# Patient Record
Sex: MALE | Race: BLACK OR AFRICAN AMERICAN | NOT HISPANIC OR LATINO | Employment: OTHER | URBAN - METROPOLITAN AREA
[De-identification: names, ages, dates, MRNs, and addresses within clinical notes are randomized per-mention and may not be internally consistent; named-entity substitution may affect disease eponyms.]

---

## 2020-06-20 ENCOUNTER — HOSPITAL ENCOUNTER (EMERGENCY)
Facility: HOSPITAL | Age: 35
Discharge: HOME | End: 2020-06-20
Attending: EMERGENCY MEDICINE
Payer: MEDICAID

## 2020-06-20 VITALS
WEIGHT: 150 LBS | SYSTOLIC BLOOD PRESSURE: 106 MMHG | TEMPERATURE: 98 F | DIASTOLIC BLOOD PRESSURE: 67 MMHG | OXYGEN SATURATION: 99 % | HEART RATE: 66 BPM | RESPIRATION RATE: 16 BRPM

## 2020-06-20 DIAGNOSIS — N43.2 OTHER HYDROCELE: Primary | ICD-10-CM

## 2020-06-20 DIAGNOSIS — N43.3 HYDROCELE IN ADULT: ICD-10-CM

## 2020-06-20 PROCEDURE — 99282 EMERGENCY DEPT VISIT SF MDM: CPT

## 2020-06-20 PROCEDURE — 99283 EMERGENCY DEPT VISIT LOW MDM: CPT | Mod: ,,, | Performed by: EMERGENCY MEDICINE

## 2020-06-20 PROCEDURE — 99283 PR EMERGENCY DEPT VISIT,LEVEL III: ICD-10-PCS | Mod: ,,, | Performed by: EMERGENCY MEDICINE

## 2020-06-20 RX ORDER — LEVOFLOXACIN 500 MG/1
500 TABLET, FILM COATED ORAL
COMMUNITY

## 2020-06-20 NOTE — ED PROVIDER NOTES
Encounter Date: 6/20/2020       History     Chief Complaint   Patient presents with    Testicle Pain     states he went to [facility] and was told it is an infection but wants a second opinion     35-year-old male presents with persistent swelling to his right testicle.  He has had this for several months.  He was seen at [facility] 8 days ago and was diagnosed with hydrocele and epididymitis.  He was taking antibiotics and he states that the pain is gone away but the swelling persist.  He denies any fever or dysuria.        Review of patient's allergies indicates:  No Known Allergies  History reviewed. No pertinent past medical history.  History reviewed. No pertinent surgical history.  No family history on file.  Social History     Tobacco Use    Smoking status: Light Tobacco Smoker    Smokeless tobacco: Never Used   Substance Use Topics    Alcohol use: Never     Frequency: Never    Drug use: Never     Review of Systems   Constitutional: Negative for diaphoresis and fever.   HENT: Negative for congestion.        Physical Exam     Initial Vitals [06/20/20 1404]   BP Pulse Resp Temp SpO2   106/67 66 16 97.5 °F (36.4 °C) 99 %      MAP       --         Physical Exam    Constitutional: He appears well-developed and well-nourished. He is not diaphoretic.   HENT:   Head: Normocephalic.   Eyes: EOM are normal. Pupils are equal, round, and reactive to light.   Neck: Normal range of motion. Neck supple. No JVD present.   Cardiovascular: Normal rate, regular rhythm and normal heart sounds.   Pulmonary/Chest: Breath sounds normal.   Genitourinary:    Genitourinary Comments: Patient swelling to his right testicle.  There is no erythema.  It is nontender.           ED Course   Procedures  Labs Reviewed - No data to display       Imaging Results    None          Medical Decision Making:   History:   Old Medical Records: I decided to obtain old medical records.  Old Records Summarized: records from previous  admission(s).       <> Summary of Records: I reviewed the ultrasound results from [facility] on June 12, 2020.  He had epididymitis and a right hydrocele.  Initial Assessment:   I suspect he has a right hydrocele.  I doubt this is malignant but did recommend follow-up with urologist.  No sign of infection on physical exam or by history.  Do not believe testing is indicated.  I placed a referral to Urology as well as recommended follow-up with primary care                                 Clinical Impression:       ICD-10-CM ICD-9-CM   1. Other hydrocele  N43.2 603.8   2. Hydrocele in adult  N43.3 603.9         Disposition:   Disposition: Discharged  Condition: Stable     ED Disposition Condition    Discharge Stable        ED Prescriptions     None        Follow-up Information     Follow up With Specialties Details Why Contact Info Additional Information    [facility] - Internal Medicine Internal Medicine Schedule an appointment as soon as possible for a visit in 2 days For Primary Care and Emergency Department Follow-up [address] [id number] [phone] [facility].    [facility] - Urology 4th Floor Urology Schedule an appointment as soon as possible for a visit   [address] [id number] [phone] [facility] - 4th Floor                                     Matt Reed MD  06/20/20 4700

## 2020-06-20 NOTE — ED NOTES
LOC: The patient is awake, alert, and oriented to place, time, situation. Affect is appropriate.  Speech is appropriate and clear.     APPEARANCE: Patient resting uncomfortably, concerned with right testicle infection not improving,  in no acute distress.  Patient is clean and well groomed.    SKIN: The skin is warm and dry; color consistent with ethnicity.  Patient has normal skin turgor and moist mucus membranes.  Skin intact; no breakdown or bruising noted.     MUSCULOSKELETAL: Patient moving upper and lower extremities without difficulty.  Denies weakness.     RESPIRATORY: Airway is open and patent. Respirations spontaneous, even, easy, and non-labored.  Patient has a normal effort and rate.  No accessory muscle use noted. Denies cough.     CARDIAC:   No peripheral edema noted. No complaints of chest pain.      ABDOMEN: Soft and non tender to palpation.  No distention noted.     NEUROLOGIC: Eyes open spontaneously.  Behavior appropriate to situation.  Follows commands; facial expression symmetrical.  Purposeful motor response noted; normal sensation in all extremities.

## 2020-06-20 NOTE — ED NOTES
To the ED for a second opinion. Seen at [facility] a week ago for infection near his testicle.  Has been taking ABX with no improvement.

## 2020-06-28 NOTE — PROGRESS NOTES
"Subjective:      Julián Corrigan is a 35 y.o. male who was self-referred for evaluation of scrotal swelling.      35-year-old male presents with persistent swelling to his right testicle.  He has had this for several months.  He first presented to [facility] ED on 6/12. US was performed. See below. Notes from this visit were reviewed under "care everywhere." GC/CHL was negative at the time. He was given rx for Levaquin and doxycycline. He completed antibiotics. Pain resolved but swelling did not subside. He then presented [place] [facility] [place] opinion. He was told that he has a hydrocele and was referred to Urology. He presents today to discuss swelling. He denies pain. Denies fever, chills, N/V. Denies STD exposure since last antibiotic. He denies dysuria, hematuria, urgency and frequency. Denies personal or family history of testicular cancer. Denies hernia. Denies previous  surgeries. No blood thinners.     The following portions of the patient's history were reviewed and updated as appropriate: allergies, current medications, past family history, past medical history, past social history, past surgical history and problem list.    Review of Systems  Constitutional: no fever or chills  ENT: no nasal congestion or sore throat  Respiratory: no cough or shortness of breath  Cardiovascular: no chest pain or palpitations  Gastrointestinal: no nausea or vomiting, tolerating diet  Genitourinary: as per HPI  Hematologic/Lymphatic: no easy bruising or lymphadenopathy  Musculoskeletal: no arthralgias or myalgias  Neurological: no seizures or tremors  Behavioral/Psych: no auditory or visual hallucinations     Objective:   Vitals: Wt 61.3 kg (135 lb 2.3 oz)     Physical Exam   General: alert and oriented, no acute distress  Head: normocephalic, atraumatic  Neck: supple, no lymphadenopathy, normal ROM, no masses  Respiratory: Symmetric expansion, non-labored breathing  Cardiovascular: regular rate and rhythm, nomal pulses, no " peripheral edema  Abdomen: soft, non tender, non distended, no palpable masses, no hernias, no hepatomegaly or splenomegaly  Genitourinary:   Penis: normal, no lesions, patent orthotopic meatus, no plaques  Scrotum: no rashes or skin changes;   Testes:  normal epididymides bilaterally, Right small to medium sized hydrocele noted  Lymphatic: no inguinal nodes  Skin: normal coloration and turgor, no rashes, no suspicious skin lesions noted  Neuro: alert and oriented x3, no gross deficits  Psych: normal judgment and insight, normal mood/affect and non-anxious    Physical Exam    Lab Review   Urinalysis demonstrates positive for protein (trace)    No results found for: WBC, HGB, HCT, MCV, PLT  No results found for: CREATININE, BUN  No results found for: PSA  Imaging    EXAM END TIME:6/12/2020 07:43 AM    REASON FOR STUDY:scrotal swelling    COMPARISON:None.      FINDINGS:  Right testicle is 3.0 x 2.4 x 2.0 cm and demonstrates normal flow.  Left testicle measures 3.7 x 1.6 x 2.7 cm and demonstrates normal flow. There is a loculated right hydrocele that is mildly complex. Right epididymal thickening compatible with epididymitis. Left epididymis is minimally thickened.    IMPRESSION:   1. Right epididymitis.  2. Right mildly complex hydrocele.        Electronically Signed By: Travis Rose MD 6/12/2020 8:28 AM CDT      Assessment:     1. Hydrocele, right        Plan:     Orders Placed This Encounter    Urine culture    C. trachomatis/N. gonorrhoeae by [facility]; Urine     Discussed conservative measures for relieving testicular pain - scrotal support, ibuprofen, scrotal elevation, ice packs.  --Will send urine for culture/ GC/CHL to r/o persistent infection. Low suspicion for infectious process.   --Pt wishes to proceed with surgical intervention of hydrocele. Will discuss surgery with collaboration MDs--Pt is aware that priority is given to urgent or emergent cases due to COVID 19. He expresses frustration,  but understands. Will notify him of results and if additional treatment is needed.     7/1 update: Pt information sent to Dr. Matson's staff for appt to assess hydrocele and discuss moving forward with surgical intervention. Dr. Matson is aware.

## 2020-06-29 ENCOUNTER — OFFICE VISIT (OUTPATIENT)
Dept: UROLOGY | Facility: CLINIC | Age: 35
End: 2020-06-29
Payer: MEDICAID

## 2020-06-29 VITALS — WEIGHT: 135.13 LBS

## 2020-06-29 DIAGNOSIS — N43.3 HYDROCELE, RIGHT: Primary | ICD-10-CM

## 2020-06-29 PROCEDURE — 87491 CHLMYD TRACH DNA AMP PROBE: CPT

## 2020-06-29 PROCEDURE — 87086 URINE CULTURE/COLONY COUNT: CPT

## 2020-06-29 PROCEDURE — 99213 OFFICE O/P EST LOW 20 MIN: CPT | Mod: PBBFAC,PN | Performed by: NURSE PRACTITIONER

## 2020-06-29 PROCEDURE — 99999 PR PBB SHADOW E&M-EST. PATIENT-LVL III: ICD-10-PCS | Mod: PBBFAC,,, | Performed by: NURSE PRACTITIONER

## 2020-06-29 PROCEDURE — 99203 OFFICE O/P NEW LOW 30 MIN: CPT | Mod: S$PBB,,, | Performed by: NURSE PRACTITIONER

## 2020-06-29 PROCEDURE — 99999 PR PBB SHADOW E&M-EST. PATIENT-LVL III: CPT | Mod: PBBFAC,,, | Performed by: NURSE PRACTITIONER

## 2020-06-29 PROCEDURE — 99203 PR OFFICE/OUTPT VISIT, NEW, LEVL III, 30-44 MIN: ICD-10-PCS | Mod: S$PBB,,, | Performed by: NURSE PRACTITIONER

## 2020-07-01 ENCOUNTER — TELEPHONE (OUTPATIENT)
Dept: UROLOGY | Facility: CLINIC | Age: 35
End: 2020-07-01

## 2020-07-01 LAB
BACTERIA UR CULT: NORMAL
C TRACH DNA SPEC QL NAA+PROBE: NOT DETECTED
N GONORRHOEA DNA SPEC QL NAA+PROBE: NOT DETECTED

## 2020-07-01 NOTE — TELEPHONE ENCOUNTER
----- Message from Laverne Tran NP sent at 7/1/2020  2:48 PM CDT -----  Regarding: Appt for hydrocele  Another pt with a hydrocele requesting surgery. Dr. Matson is aware. Not urgent, first available.    Thanks again,  Laverne

## 2020-07-17 ENCOUNTER — OFFICE VISIT (OUTPATIENT)
Dept: UROLOGY | Facility: CLINIC | Age: 35
End: 2020-07-17
Attending: UROLOGY
Payer: MEDICAID

## 2020-07-17 VITALS
WEIGHT: 145 LBS | SYSTOLIC BLOOD PRESSURE: 105 MMHG | BODY MASS INDEX: 23.3 KG/M2 | DIASTOLIC BLOOD PRESSURE: 68 MMHG | HEIGHT: 66 IN | HEART RATE: 74 BPM

## 2020-07-17 DIAGNOSIS — N43.3 HYDROCELE, RIGHT: Primary | ICD-10-CM

## 2020-07-17 PROCEDURE — 99214 OFFICE O/P EST MOD 30 MIN: CPT | Mod: S$PBB,,, | Performed by: UROLOGY

## 2020-07-17 PROCEDURE — 99214 PR OFFICE/OUTPT VISIT, EST, LEVL IV, 30-39 MIN: ICD-10-PCS | Mod: S$PBB,,, | Performed by: UROLOGY

## 2020-07-17 PROCEDURE — 99213 OFFICE O/P EST LOW 20 MIN: CPT | Mod: PBBFAC,PO | Performed by: UROLOGY

## 2020-07-17 PROCEDURE — 99999 PR PBB SHADOW E&M-EST. PATIENT-LVL III: CPT | Mod: PBBFAC,,, | Performed by: UROLOGY

## 2020-07-17 PROCEDURE — 99999 PR PBB SHADOW E&M-EST. PATIENT-LVL III: ICD-10-PCS | Mod: PBBFAC,,, | Performed by: UROLOGY

## 2020-07-17 NOTE — PROGRESS NOTES
"Subjective:      Julián Corrigan is a 35 y.o. male who returns today regarding his hydrocele.    He had acute onset right testicular pain and swelling 1 month ago. He was seen at [facility] in ER and labs and imaging demonstrated epididymitis (culture proven). He was treated with antibiotics. He continues to have swelling in the right testicle, though the pain is mostly resolved. He is bothered by the size of the testicle, however.    The following portions of the patient's history were reviewed and updated as appropriate: allergies, current medications, past family history, past medical history, past social history, past surgical history and problem list.    Review of Systems  A comprehensive multipoint review of systems was negative except as otherwise stated in the HPI.     Objective:   Vitals: /68 (BP Location: Left arm, Patient Position: Sitting, BP Method: Medium (Automatic))   Pulse 74   Ht 5' 6" (1.676 m)   Wt 65.8 kg (145 lb)   BMI 23.40 kg/m²     Physical Exam   General: alert and oriented, no acute distress  Respiratory: Symmetric expansion, non-labored breathing  Genitourinary: moderate size right hydrocele, very firm; no scrotal erythema; left testis normal, right non-palpable  Skin: normal coloration and turgor, no rashes, no suspicious skin lesions noted  Neuro: no gross deficits  Psych: normal judgment and insight, normal mood/affect and non-anxious    Lab Review     Imaging   Scrotal US (6/12 at [facility]):   FINDINGS:  Right testicle is 3.0 x 2.4 x 2.0 cm and demonstrates normal flow.  Left testicle measures 3.7 x 1.6 x 2.7 cm and demonstrates normal flow. There is a loculated right hydrocele that is mildly complex. Right epididymal thickening compatible with epididymitis. Left epididymis is minimally thickened.    IMPRESSION:   1. Right epididymitis.  2. Right mildly complex hydrocele.    Assessment and Plan:   1. Hydrocele, right  -- Explained that some of his persistent swelling in right testicle is " common 1 month after epididymitis and will likely improve some on its own.  -- We discussed surgery as option for treating hydrocele, however in setting of reactive hydrocele recommend waiting at least 3 months before considering surgery as the inflammation increases risks for complication and recurrence  -- Recommend scrotal support to decrease discomfort and help reduce swelling  -- FU 6 weeks. If not resolved then, will consider hydrocelectomy

## 2020-07-24 ENCOUNTER — TELEPHONE (OUTPATIENT)
Dept: UROLOGY | Facility: CLINIC | Age: 35
End: 2020-07-24
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
## 2020-07-24 NOTE — TELEPHONE ENCOUNTER
Spoke with the pt and answered all questions. Pt verbalized understanding. Pt 's follow/up appointment with Dr. Matson on 8/28-1:15p.            Thanks Evangelina

## 2020-08-25 ENCOUNTER — HOSPITAL ENCOUNTER (EMERGENCY)
Facility: HOSPITAL | Age: 35
Discharge: HOME | End: 2020-08-25
Attending: EMERGENCY MEDICINE
Payer: MEDICAID

## 2020-08-25 VITALS
OXYGEN SATURATION: 97 % | BODY MASS INDEX: 23.3 KG/M2 | DIASTOLIC BLOOD PRESSURE: 71 MMHG | HEIGHT: 66 IN | RESPIRATION RATE: 17 BRPM | SYSTOLIC BLOOD PRESSURE: 114 MMHG | WEIGHT: 145 LBS | TEMPERATURE: 98 F | HEART RATE: 74 BPM

## 2020-08-25 DIAGNOSIS — M62.838 MUSCLE SPASM: Primary | ICD-10-CM

## 2020-08-25 DIAGNOSIS — M62.838 MUSCLE SPASMS OF NECK: ICD-10-CM

## 2020-08-25 PROCEDURE — 99284 EMERGENCY DEPT VISIT MOD MDM: CPT | Mod: ,,, | Performed by: EMERGENCY MEDICINE

## 2020-08-25 PROCEDURE — 99284 PR EMERGENCY DEPT VISIT,LEVEL IV: ICD-10-PCS | Mod: ,,, | Performed by: EMERGENCY MEDICINE

## 2020-08-25 PROCEDURE — 99283 EMERGENCY DEPT VISIT LOW MDM: CPT

## 2020-08-25 RX ORDER — METHOCARBAMOL 500 MG/1
1000 TABLET, FILM COATED ORAL 3 TIMES DAILY
Qty: 30 TABLET | Refills: 0 | Status: SHIPPED | OUTPATIENT
Start: 2020-08-25 | End: 2020-08-30
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
## 2020-08-28 ENCOUNTER — OFFICE VISIT (OUTPATIENT)
Dept: UROLOGY | Facility: CLINIC | Age: 35
End: 2020-08-28
Payer: MEDICAID

## 2020-08-28 VITALS
BODY MASS INDEX: 23.3 KG/M2 | WEIGHT: 145 LBS | HEIGHT: 66 IN | HEART RATE: 71 BPM | DIASTOLIC BLOOD PRESSURE: 57 MMHG | SYSTOLIC BLOOD PRESSURE: 93 MMHG

## 2020-08-28 DIAGNOSIS — N43.3 HYDROCELE, RIGHT: Primary | ICD-10-CM

## 2020-08-28 PROCEDURE — 99213 PR OFFICE/OUTPT VISIT, EST, LEVL III, 20-29 MIN: ICD-10-PCS | Mod: S$PBB,,, | Performed by: UROLOGY

## 2020-08-28 PROCEDURE — 99999 PR PBB SHADOW E&M-EST. PATIENT-LVL III: ICD-10-PCS | Mod: PBBFAC,,, | Performed by: UROLOGY

## 2020-08-28 PROCEDURE — 99999 PR PBB SHADOW E&M-EST. PATIENT-LVL III: CPT | Mod: PBBFAC,,, | Performed by: UROLOGY

## 2020-08-28 PROCEDURE — 99213 OFFICE O/P EST LOW 20 MIN: CPT | Mod: S$PBB,,, | Performed by: UROLOGY

## 2020-08-28 PROCEDURE — 99213 OFFICE O/P EST LOW 20 MIN: CPT | Mod: PBBFAC,PO | Performed by: UROLOGY

## 2020-08-28 NOTE — PROGRESS NOTES
"Subjective:      Julián Corrigan is a 35 y.o. male who returns today regarding his hydrocele.    He had acute onset right testicular pain and swelling in June. He was seen at [facility] in ER and labs and imaging demonstrated epididymitis (culture proven). He was treated with antibiotics.     He was seen in July due to continued swelling in the right testicle, though the pain was mostly resolved. He remained bothered by the size of the testicle at that time.    Today he states the size of they hydrocele has decreased significantly and he is no longer bothered by it.    The following portions of the patient's history were reviewed and updated as appropriate: allergies, current medications, past family history, past medical history, past social history, past surgical history and problem list.    Review of Systems  A comprehensive multipoint review of systems was negative except as otherwise stated in the HPI.     Objective:   Vitals: BP (!) 93/57 (BP Location: Left arm, Patient Position: Sitting, BP Method: Large (Automatic))   Pulse 71   Ht 5' 6" (1.676 m)   Wt 65.8 kg (145 lb)   BMI 23.40 kg/m²     Physical Exam   General: alert and oriented, no acute distress  Respiratory: Symmetric expansion, non-labored breathing  Genitourinary: Small right hydrocele and normal testicle  Skin: normal coloration and turgor, no rashes, no suspicious skin lesions noted  Neuro: no gross deficits  Psych: normal judgment and insight, normal mood/affect and non-anxious      Assessment and Plan:   1. Hydrocele, right  -- Mostly resolved and no longer bothered  -- FU PRN       "

## 2021-11-18 ENCOUNTER — HOSPITAL ENCOUNTER (EMERGENCY)
Facility: HOSPITAL | Age: 36
Discharge: HOME | End: 2021-11-18
Attending: EMERGENCY MEDICINE
Payer: MEDICAID

## 2021-11-18 VITALS
HEART RATE: 71 BPM | OXYGEN SATURATION: 99 % | TEMPERATURE: 98 F | RESPIRATION RATE: 16 BRPM | SYSTOLIC BLOOD PRESSURE: 119 MMHG | DIASTOLIC BLOOD PRESSURE: 62 MMHG

## 2021-11-18 DIAGNOSIS — M79.89 SWELLING OF ARM: ICD-10-CM

## 2021-11-18 DIAGNOSIS — M77.9 TENDONITIS: Primary | ICD-10-CM

## 2021-11-18 DIAGNOSIS — M79.601 RIGHT ARM PAIN: ICD-10-CM

## 2021-11-18 PROCEDURE — 25000003 PHARM REV CODE 250: Performed by: PHYSICIAN ASSISTANT

## 2021-11-18 PROCEDURE — 99284 EMERGENCY DEPT VISIT MOD MDM: CPT | Mod: ,,, | Performed by: EMERGENCY MEDICINE

## 2021-11-18 PROCEDURE — 99284 EMERGENCY DEPT VISIT MOD MDM: CPT | Mod: 25

## 2021-11-18 PROCEDURE — 99284 PR EMERGENCY DEPT VISIT,LEVEL IV: ICD-10-PCS | Mod: ,,, | Performed by: EMERGENCY MEDICINE

## 2021-11-18 RX ORDER — NAPROXEN 500 MG/1
500 TABLET ORAL 2 TIMES DAILY PRN
Qty: 10 TABLET | Refills: 0 | Status: SHIPPED | OUTPATIENT
Start: 2021-11-18

## 2021-11-18 RX ORDER — NAPROXEN 500 MG/1
500 TABLET ORAL
Status: COMPLETED | OUTPATIENT
Start: 2021-11-18 | End: 2021-11-18

## 2021-11-18 RX ORDER — ACETAMINOPHEN 325 MG/1
650 TABLET ORAL
Status: COMPLETED | OUTPATIENT
Start: 2021-11-18 | End: 2021-11-18

## 2021-11-18 RX ADMIN — NAPROXEN 500 MG: 500 TABLET ORAL at 12:11

## 2021-11-18 RX ADMIN — ACETAMINOPHEN 650 MG: 325 TABLET ORAL at 12:11

## 2024-01-04 ENCOUNTER — HOSPITAL ENCOUNTER (EMERGENCY)
Facility: HOSPITAL | Age: 39
Discharge: LEFT BEFORE DISCHARGE | End: 2024-01-04
Attending: EMERGENCY MEDICINE
Payer: MEDICAID

## 2024-01-04 VITALS
DIASTOLIC BLOOD PRESSURE: 69 MMHG | HEART RATE: 83 BPM | SYSTOLIC BLOOD PRESSURE: 130 MMHG | BODY MASS INDEX: 23.54 KG/M2 | WEIGHT: 150 LBS | HEIGHT: 67 IN | TEMPERATURE: 98 F | OXYGEN SATURATION: 94 % | RESPIRATION RATE: 18 BRPM

## 2024-01-04 DIAGNOSIS — N50.82 SCROTAL PAIN: ICD-10-CM

## 2024-01-04 LAB
BACTERIA #/AREA URNS AUTO: ABNORMAL /HPF
BILIRUB UR QL STRIP: NEGATIVE
CLARITY UR REFRACT.AUTO: ABNORMAL
COLOR UR AUTO: YELLOW
GLUCOSE UR QL STRIP: NEGATIVE
HGB UR QL STRIP: ABNORMAL
HYALINE CASTS UR QL AUTO: 0 /LPF
KETONES UR QL STRIP: ABNORMAL
LEUKOCYTE ESTERASE UR QL STRIP: ABNORMAL
MICROSCOPIC COMMENT: ABNORMAL
NITRITE UR QL STRIP: NEGATIVE
PH UR STRIP: 6 [PH] (ref 5–8)
PROT UR QL STRIP: ABNORMAL
RBC #/AREA URNS AUTO: 25 /HPF (ref 0–4)
SP GR UR STRIP: >1.03 (ref 1–1.03)
SQUAMOUS #/AREA URNS AUTO: 1 /HPF
URN SPEC COLLECT METH UR: ABNORMAL
WBC #/AREA URNS AUTO: >100 /HPF (ref 0–5)

## 2024-01-04 PROCEDURE — 87086 URINE CULTURE/COLONY COUNT: CPT | Performed by: EMERGENCY MEDICINE

## 2024-01-04 PROCEDURE — 81001 URINALYSIS AUTO W/SCOPE: CPT | Performed by: EMERGENCY MEDICINE

## 2024-01-04 PROCEDURE — 99282 EMERGENCY DEPT VISIT SF MDM: CPT

## 2024-01-04 PROCEDURE — 87491 CHLMYD TRACH DNA AMP PROBE: CPT | Performed by: EMERGENCY MEDICINE

## 2024-01-04 RX ORDER — KETOROLAC TROMETHAMINE 30 MG/ML
30 INJECTION, SOLUTION INTRAMUSCULAR; INTRAVENOUS
Status: DISCONTINUED | OUTPATIENT
Start: 2024-01-04 | End: 2024-01-04 | Stop reason: HOSPADM

## 2024-01-04 NOTE — ED NOTES
APPEARANCE: awake and alert in NAD.  SKIN: warm, dry and intact. No breakdown or bruising.  MUSCULOSKELETAL: Patient moving all extremities spontaneously, no obvious swelling or deformities noted. Ambulates independently.  RESPIRATORY: Denies shortness of breath.Respirations unlabored.   CARDIAC: Denies CP, 2+ distal pulses; no peripheral edema  ABDOMEN: S/ND/NT, Denies nausea  : voids spontaneously, denies difficulty, + testicular pain and swelling  Neurologic: AAO x 4; follows commands equal strength in all extremities; denies numbness/tingling. Denies dizziness

## 2024-01-04 NOTE — ED NOTES
Julián Corrigan, a 38 y.o. male presents to the ED w/ complaint of testicular pain and swelling for the past two days. States he noticed the swelling in the shower, states he has been sexually active. Denies any discharge or burning when urinating.     Triage note:  Chief Complaint   Patient presents with    Testicle Pain     Swelling x2 days     Review of patient's allergies indicates:  No Known Allergies  History reviewed. No pertinent past medical history.

## 2024-01-05 ENCOUNTER — TELEPHONE (OUTPATIENT)
Dept: EMERGENCY MEDICINE | Facility: HOSPITAL | Age: 39
End: 2024-01-05
Payer: MEDICAID

## 2024-01-05 LAB
BACTERIA UR CULT: NO GROWTH
C TRACH DNA SPEC QL NAA+PROBE: DETECTED
N GONORRHOEA DNA SPEC QL NAA+PROBE: NOT DETECTED

## 2024-01-05 RX ORDER — DOXYCYCLINE 100 MG/1
100 CAPSULE ORAL 2 TIMES DAILY
Qty: 20 CAPSULE | Refills: 0 | Status: SHIPPED | OUTPATIENT
Start: 2024-01-05 | End: 2024-01-15
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
## 2024-01-05 NOTE — TELEPHONE ENCOUNTER
Educated patient on positive chlamydia results.  Sent in doxycycline.  Educated to discontinue from any sexual activity and update partners about STD.  Patient verbalized understanding and agreed to plan.

## 2024-01-05 NOTE — ED PROVIDER NOTES
Encounter Date: 1/4/2024       History     Chief Complaint   Patient presents with    Testicle Pain     Swelling x2 days     This patient is a 38-year-old male, previously healthy presenting to the emergency department complaints of dysuria, bilateral tenderness of the testicles with mild scrotal swelling bilaterally.  He denies penile discharge, but later reports concern he may have an STI to the nurse.  He denies nausea, vomiting, abdominal pain.  He reports improvement in pain when he elevates his scrotum.  He reports a previous history of hernia but does not think this has recurred.      Review of patient's allergies indicates:  No Known Allergies  History reviewed. No pertinent past medical history.  History reviewed. No pertinent surgical history.  History reviewed. No pertinent family history.  Social History     Tobacco Use    Smoking status: Light Smoker    Smokeless tobacco: Never   Substance Use Topics    Alcohol use: Never    Drug use: Never     Review of Systems    Physical Exam     Initial Vitals [01/04/24 1547]   BP Pulse Resp Temp SpO2   (!) 111/57 72 20 98.7 °F (37.1 °C) 95 %      MAP       --         Physical Exam    Constitutional: Vital signs are normal. He appears well-nourished.   HENT:   Head: Normocephalic and atraumatic.   Eyes: Conjunctivae and EOM are normal.   Neck: Neck supple. No thyroid mass present.   Normal range of motion.  Cardiovascular:  Normal rate, regular rhythm and normal heart sounds.     Exam reveals no gallop and no friction rub.       No murmur heard.  Pulmonary/Chest: Breath sounds normal. He has no wheezes. He has no rhonchi. He has no rales.   Abdominal: Abdomen is soft. Bowel sounds are normal. There is no abdominal tenderness.   Genitourinary:    Penis normal.      Genitourinary Comments: No direct hernia, positive phren's sign, no large hydrocele, no varicocele, mild tenderness with manipulation of the scrotum bilaterally     Musculoskeletal:      Cervical back:  Normal range of motion and neck supple.     Neurological: He is alert and oriented to person, place, and time. He has normal strength. No cranial nerve deficit or sensory deficit.   Skin: Skin is warm and dry. No rash noted. No erythema.   Psychiatric: He has a normal mood and affect. His speech is normal. Cognition and memory are normal.         ED Course   Procedures  Labs Reviewed   URINALYSIS, REFLEX TO URINE CULTURE - Abnormal; Notable for the following components:       Result Value    Appearance, UA Hazy (*)     Specific Gravity, UA >1.030 (*)     Protein, UA 2+ (*)     Ketones, UA Trace (*)     Occult Blood UA 1+ (*)     Leukocytes, UA 3+ (*)     All other components within normal limits    Narrative:     Specimen Source->Urine   URINALYSIS MICROSCOPIC - Abnormal; Notable for the following components:    RBC, UA 25 (*)     WBC, UA >100 (*)     Bacteria Few (*)     All other components within normal limits    Narrative:     Specimen Source->Urine   C. TRACHOMATIS/N. GONORRHOEAE BY AMP DNA   CULTURE, URINE          Imaging Results    None          Medications - No data to display  Medical Decision Making  This patient was emergently assessed shortly after reaching an intake bed.  No gross abnormality noted on physical examination.  Reporting multi day history of bilateral testicular discomfort.  Reports concern for STI.  UA obtained and does indicate evidence of pyuria.  Urine culture pending.  Gonorrhea and chlamydial testing on the urine is also initiated.  Before the patient could be re-evaluated he was noted to have eloped the emergency department for unknown reasons.    Amount and/or Complexity of Data Reviewed  Labs: ordered.                                      Clinical Impression:  Final diagnoses:  [N50.82] Scrotal pain          ED Disposition Condition    Eloped                 Sebastian Bautista MD  01/04/24 4058